# Patient Record
Sex: MALE | URBAN - METROPOLITAN AREA
[De-identification: names, ages, dates, MRNs, and addresses within clinical notes are randomized per-mention and may not be internally consistent; named-entity substitution may affect disease eponyms.]

---

## 2019-01-03 ENCOUNTER — OFFICE VISIT (OUTPATIENT)
Dept: URBAN - METROPOLITAN AREA CLINIC 40 | Facility: CLINIC | Age: 34
End: 2019-01-03

## 2019-01-03 DIAGNOSIS — T15.01XA FOREIGN BODY IN CORNEA, RIGHT EYE, INITIAL ENCOUNTER: Primary | ICD-10-CM

## 2019-01-03 DIAGNOSIS — T15.02XA FOREIGN BODY IN CORNEA, LEFT EYE, INITIAL ENCOUNTER: ICD-10-CM

## 2019-01-03 PROCEDURE — 65222 REMOVE FOREIGN BODY FROM EYE: CPT | Performed by: OPTOMETRIST

## 2019-01-03 PROCEDURE — 99203 OFFICE O/P NEW LOW 30 MIN: CPT | Performed by: OPTOMETRIST

## 2019-01-03 RX ORDER — TOBRAMYCIN AND DEXAMETHASONE 3; 1 MG/ML; MG/ML
SUSPENSION/ DROPS OPHTHALMIC
Qty: 5 | Refills: 0 | Status: INACTIVE
Start: 2019-01-03 | End: 2019-01-09

## 2019-01-03 NOTE — IMPRESSION/PLAN
Impression: Foreign body in cornea, right eye, initial encounter: T15.01xA. Plan: Discussed diagnosis in detail with patient. Will continue to observe condition and or symptoms. Removed with spud. New medication(s) Rx given today.